# Patient Record
Sex: MALE | Race: BLACK OR AFRICAN AMERICAN | URBAN - METROPOLITAN AREA
[De-identification: names, ages, dates, MRNs, and addresses within clinical notes are randomized per-mention and may not be internally consistent; named-entity substitution may affect disease eponyms.]

---

## 2021-07-23 ENCOUNTER — OFFICE VISIT (OUTPATIENT)
Dept: FAMILY MEDICINE | Facility: CLINIC | Age: 36
End: 2021-07-23
Payer: COMMERCIAL

## 2021-07-23 VITALS
DIASTOLIC BLOOD PRESSURE: 76 MMHG | SYSTOLIC BLOOD PRESSURE: 120 MMHG | TEMPERATURE: 98.6 F | RESPIRATION RATE: 16 BRPM | OXYGEN SATURATION: 97 % | HEART RATE: 64 BPM | WEIGHT: 201.6 LBS

## 2021-07-23 DIAGNOSIS — L30.4 INTERTRIGO: Primary | ICD-10-CM

## 2021-07-23 PROCEDURE — 99203 OFFICE O/P NEW LOW 30 MIN: CPT | Performed by: PHYSICIAN ASSISTANT

## 2021-07-23 RX ORDER — LORATADINE 10 MG/1
10 TABLET ORAL DAILY
COMMUNITY

## 2021-07-23 RX ORDER — ALBUTEROL SULFATE 90 UG/1
2 AEROSOL, METERED RESPIRATORY (INHALATION) EVERY 6 HOURS
COMMUNITY

## 2021-07-23 RX ORDER — MONTELUKAST SODIUM 10 MG/1
10 TABLET ORAL AT BEDTIME
COMMUNITY

## 2021-07-23 RX ORDER — CLOTRIMAZOLE AND BETAMETHASONE DIPROPIONATE 10; .64 MG/G; MG/G
CREAM TOPICAL 2 TIMES DAILY
Qty: 45 G | Refills: 1 | Status: SHIPPED | OUTPATIENT
Start: 2021-07-23

## 2021-07-23 NOTE — PATIENT INSTRUCTIONS
Patient Education     Candida Skin Infection (Adult)   Candida is a type of yeast. It grows naturally on the skin and in the mouth. If it grows out of control, it can cause an infection. Candida can cause infections in the genital area, skin folds, in the mouth, and under the breasts. Anyone can get this infection. It is more common in a person with a weak immune system, such as from diabetes, HIV, or cancer. It s also more common in someone who has been on antibiotic therapy. And it s more common in people who are overweight or who have incontinence. Wearing tight-fitting clothing and taking part in activities with lots of skin-to-skin contact can also put you at risk.  Candida causes the skin to become bright red and inflamed. The border of the infected part of the skin is often raised. The infection causes pain and itching. Sometimes the skin peels and bleeds. In the mouth, candida is called thrush, and may cause white thickened areas.  A Candida rash is most often treated with an antifungal cream, gel, or powder. . The rash will clear a few days after starting the medicine. Infections that don t go away may need a prescription medicine. In rare cases, a bacterial infection can also occur.  Home care  Your healthcare provider will advise using an antifungal cream, powder, or gel for the rash. He or she may also prescribe a medicine for the itch. Follow all instructions for using these medicines.  General care    Keep your skin clean by washing the area twice a day.    Use the medicine as directed until your rash is gone. Once the skin has healed, keep it dry to prevent another infection.     If you are overweight, talk with your healthcare provider about a plan to lose extra weight.    Don't wear tight-fitting clothes.    Follow-up care  Follow up with your healthcare provider, or as advised. Your rash will clear in 7 to 14 days. Call your provider if the rash is not gone after 14 days.  When to get medical  advice  Call your healthcare provider right away if any of these occur:    Pain or redness that gets worse or spreads    Fluid coming from the skin    Yellow crusts on the skin    Fever of 100.4 F (38 C) or higher, or as directed by your provider  Chon last reviewed this educational content on 10/1/2019    3457-3490 [employer]. All rights reserved. This information is not intended as a substitute for professional medical care. Always follow your healthcare professional's instructions.

## 2021-07-23 NOTE — PROGRESS NOTES
Assessment & Plan     [facility]    - clotrimazole-betamethasone (LOTRISONE) 1-0.05 % external cream  Dispense: 45 g; Refill: 1   recommend keeping clean and dry, mild soap without fregrances or dyes, may try powder to the area.  Follow-up for persistent or worsening sx.      BRIDGETTE Joiner [facility] CALLIE Richard is a 35 year old male who presents to clinic today for the following health issues:  Chief Complaint   Patient presents with     Rash     left armpit x 5 day s     HPI   5 day hx of rash, worsening.  Works in the heat, first noted itching, now red, inflammed and more burning.  NO fevers. Otherwise feels well.    Has tried nothing for his sx.  No contact irritants other than soap (bath and body) and deoderant.      PMH: asthma, allergies.        Current Outpatient Medications:      albuterol (PROAIR HFA/PROVENTIL HFA/VENTOLIN HFA) 108 (90 Base) MCG/ACT inhaler, Inhale 2 puffs into the lungs every 6 hours, Disp: , Rfl:      clotrimazole-betamethasone (LOTRISONE) 1-0.05 % external cream, Apply topically 2 times daily, Disp: 45 g, Rfl: 1     loratadine (CLARITIN) 10 MG tablet, Take 10 mg by mouth daily, Disp: , Rfl:      montelukast (SINGULAIR) 10 MG tablet, Take 10 mg by mouth At Bedtime, Disp: , Rfl:     Review of Systems  Constitutional, HEENT, cardiovascular, pulmonary, gi and gu systems are negative, except as otherwise noted.      Objective    /76 (BP Location: Right arm, Patient Position: Chair, Cuff Size: Adult Large)   Pulse 64   Temp 98.6  F (37  C) (Oral)   Resp 16   Wt 91.4 kg (201 lb 9.6 oz)   SpO2 97%   Physical Exam   nad appears well  Exam of the axilla on the L reveals erythematous macular papular  patches, irregular boarder, no vesicles ulcerations.  No pustules.

## 2022-06-16 ENCOUNTER — OFFICE VISIT (OUTPATIENT)
Dept: FAMILY MEDICINE | Facility: CLINIC | Age: 37
End: 2022-06-16
Payer: COMMERCIAL

## 2022-06-16 VITALS
DIASTOLIC BLOOD PRESSURE: 69 MMHG | HEART RATE: 75 BPM | WEIGHT: 211 LBS | OXYGEN SATURATION: 98 % | SYSTOLIC BLOOD PRESSURE: 124 MMHG | TEMPERATURE: 98.3 F

## 2022-06-16 DIAGNOSIS — H66.001 NON-RECURRENT ACUTE SUPPURATIVE OTITIS MEDIA OF RIGHT EAR WITHOUT SPONTANEOUS RUPTURE OF TYMPANIC MEMBRANE: Primary | ICD-10-CM

## 2022-06-16 PROCEDURE — 99213 OFFICE O/P EST LOW 20 MIN: CPT

## 2022-06-16 RX ORDER — NEOMYCIN SULFATE, POLYMYXIN B SULFATE, HYDROCORTISONE 3.5; 10000; 1 MG/ML; [USP'U]/ML; MG/ML
3 SOLUTION/ DROPS AURICULAR (OTIC) 3 TIMES DAILY
Qty: 3 ML | Refills: 0 | Status: SHIPPED | OUTPATIENT
Start: 2022-06-16 | End: 2022-06-21

## 2022-06-16 RX ORDER — AMOXICILLIN 500 MG/1
500 CAPSULE ORAL 3 TIMES DAILY
Qty: 30 CAPSULE | Refills: 0 | Status: SHIPPED | OUTPATIENT
Start: 2022-06-16 | End: 2022-06-26

## 2022-06-16 NOTE — PROGRESS NOTES
Chief Complaint   Patient presents with     Ear Problem     Pressure in the right ear        ASSESSMENT/PLAN:  Jose Manuel was seen today for ear problem.    Diagnoses and all orders for this visit:    Non-recurrent acute suppurative otitis media of right ear without spontaneous rupture of tympanic membrane  -     amoxicillin (AMOXIL) 500 MG capsule; Take 1 capsule (500 mg) by mouth 3 times daily for 10 days  -     neomycin-polymyxin-hydrocortisone (CORTISPORIN) 3.5-25472-9 otic solution; Place 3 drops into the right ear 3 times daily for 5 days    Tympanic membrane was irrigated by medical assistant.  Start treatment on amoxicillin as above along with some topical drops to prevent otitis externa due to the abrasion of the external auditory canal.    Yoel Clayton PA-C      SUBJECTIVE:  Jose Manuel is a 36 year old male who presents to urgent care with right-sided ear pressure since early this morning that woke him up from sleeping.  He also had a little bit of a sore throat and some nasal congestion at that time but this has resolved.  Currently having right ear pressure.  Has a history of ear infections.  No fevers or chills and feels otherwise well    ROS: Pertinent ROS neg other than the symptoms noted above in the HPI.     OBJECTIVE:  /69 (BP Location: Right arm, Patient Position: Chair, Cuff Size: Adult Large)   Pulse 75   Temp 98.3  F (36.8  C) (Oral)   Wt 95.7 kg (211 lb)   SpO2 98%    GENERAL: healthy, alert and no distress  HENT: Left tympanic membrane within normal limits.  Right tympanic membrane partially obstructed by cerumen, after irrigation tympanic membrane was erythematous, bulging and some erythema of the external canal as well    DIAGNOSTICS    No results found for any visits on 06/16/22.     Current Outpatient Medications   Medication     albuterol (PROAIR HFA/PROVENTIL HFA/VENTOLIN HFA) 108 (90 Base) MCG/ACT inhaler     clotrimazole-betamethasone (LOTRISONE) 1-0.05 % external cream      loratadine (CLARITIN) 10 MG tablet     montelukast (SINGULAIR) 10 MG tablet     No current facility-administered medications for this visit.      There is no problem list on file for this patient.     History reviewed. No pertinent past medical history.  History reviewed. No pertinent surgical history.  History reviewed. No pertinent family history.  Social History     Tobacco Use     Smoking status: Never Smoker     Smokeless tobacco: Never Used   Substance Use Topics     Alcohol use: Yes              The plan of care was discussed with the patient. They understand and agree with the course of treatment prescribed. A printed summary was given including instructions and medications.  The use of Dragon/Skoodat dictation services may have been used to construct the content in this note; any grammatical or spelling errors are non-intentional. Please contact the author of this note directly if you are in need of any clarification.

## 2025-03-01 ENCOUNTER — OFFICE VISIT (OUTPATIENT)
Dept: URGENT CARE | Facility: URGENT CARE | Age: 40
End: 2025-03-01
Payer: COMMERCIAL

## 2025-03-01 VITALS
TEMPERATURE: 97.6 F | OXYGEN SATURATION: 95 % | RESPIRATION RATE: 17 BRPM | DIASTOLIC BLOOD PRESSURE: 77 MMHG | HEART RATE: 64 BPM | WEIGHT: 206 LBS | SYSTOLIC BLOOD PRESSURE: 121 MMHG

## 2025-03-01 DIAGNOSIS — R07.0 THROAT PAIN: Primary | ICD-10-CM

## 2025-03-01 DIAGNOSIS — B34.9 VIRAL SYNDROME: ICD-10-CM

## 2025-03-01 LAB
BASOPHILS # BLD AUTO: 0 10E3/UL (ref 0–0.2)
BASOPHILS NFR BLD AUTO: 0 %
DEPRECATED S PYO AG THROAT QL EIA: NEGATIVE
EOSINOPHIL # BLD AUTO: 0.1 10E3/UL (ref 0–0.7)
EOSINOPHIL NFR BLD AUTO: 1 %
ERYTHROCYTE [DISTWIDTH] IN BLOOD BY AUTOMATED COUNT: 12.3 % (ref 10–15)
HCT VFR BLD AUTO: 44.1 % (ref 40–53)
HGB BLD-MCNC: 14.6 G/DL (ref 13.3–17.7)
IMM GRANULOCYTES # BLD: 0 10E3/UL
IMM GRANULOCYTES NFR BLD: 0 %
LYMPHOCYTES # BLD AUTO: 2.5 10E3/UL (ref 0.8–5.3)
LYMPHOCYTES NFR BLD AUTO: 29 %
MCH RBC QN AUTO: 28.9 PG (ref 26.5–33)
MCHC RBC AUTO-ENTMCNC: 33.1 G/DL (ref 31.5–36.5)
MCV RBC AUTO: 87 FL (ref 78–100)
MONOCYTES # BLD AUTO: 0.5 10E3/UL (ref 0–1.3)
MONOCYTES NFR BLD AUTO: 6 %
NEUTROPHILS # BLD AUTO: 5.6 10E3/UL (ref 1.6–8.3)
NEUTROPHILS NFR BLD AUTO: 64 %
PLATELET # BLD AUTO: 242 10E3/UL (ref 150–450)
RBC # BLD AUTO: 5.06 10E6/UL (ref 4.4–5.9)
S PYO DNA THROAT QL NAA+PROBE: NOT DETECTED
WBC # BLD AUTO: 8.8 10E3/UL (ref 4–11)

## 2025-03-01 PROCEDURE — 36415 COLL VENOUS BLD VENIPUNCTURE: CPT | Performed by: PHYSICIAN ASSISTANT

## 2025-03-01 PROCEDURE — 85025 COMPLETE CBC W/AUTO DIFF WBC: CPT | Performed by: PHYSICIAN ASSISTANT

## 2025-03-01 PROCEDURE — 99204 OFFICE O/P NEW MOD 45 MIN: CPT | Performed by: PHYSICIAN ASSISTANT

## 2025-03-01 PROCEDURE — 87651 STREP A DNA AMP PROBE: CPT | Performed by: PHYSICIAN ASSISTANT

## 2025-03-01 RX ORDER — GUAIFENESIN 600 MG/1
1200 TABLET, EXTENDED RELEASE ORAL 2 TIMES DAILY
Qty: 30 TABLET | Refills: 0 | Status: SHIPPED | OUTPATIENT
Start: 2025-03-01

## 2025-03-01 RX ORDER — FLUTICASONE PROPIONATE 50 MCG
1 SPRAY, SUSPENSION (ML) NASAL DAILY
Qty: 9.9 ML | Refills: 0 | Status: SHIPPED | OUTPATIENT
Start: 2025-03-01

## 2025-03-01 ASSESSMENT — ENCOUNTER SYMPTOMS
VOICE CHANGE: 1
FEVER: 1
SHORTNESS OF BREATH: 0
RHINORRHEA: 1
SORE THROAT: 1
COUGH: 1

## 2025-03-01 NOTE — PROGRESS NOTES
SUBJECTIVE:   Jose Manuel Morris is a 39 year old male presenting with a chief complaint of   Chief Complaint   Patient presents with    Urgent Care     Pt presents nasal congestion, throat pain, a lot of mucus in throat and nose, onset 6 days.        He is an established patient of [facility].  Tmax 101.7.  SOB.  Hx of asthma    Treatment:  theraflu and otc cold.    Review of Systems   Constitutional:  Positive for fever.   HENT:  Positive for congestion, postnasal drip, rhinorrhea, sore throat and voice change. Negative for ear pain.    Respiratory:  Positive for cough. Negative for shortness of breath.    All other systems reviewed and are negative.      No past medical history on file.  No family history on file.  Current Outpatient Medications   Medication Sig Dispense Refill    albuterol (PROAIR HFA/PROVENTIL HFA/VENTOLIN HFA) 108 (90 Base) MCG/ACT inhaler Inhale 2 puffs into the lungs every 6 hours      clotrimazole-betamethasone (LOTRISONE) 1-0.05 % external cream Apply topically 2 times daily 45 g 1    fluticasone (FLONASE) 50 MCG/ACT nasal spray Spray 1 spray into both nostrils daily. 9.9 mL 0    guaiFENesin (MUCINEX) 600 MG 12 hr tablet Take 2 tablets (1,200 mg) by mouth 2 times daily. 30 tablet 0    loratadine (CLARITIN) 10 MG tablet Take 10 mg by mouth daily      montelukast (SINGULAIR) 10 MG tablet Take 10 mg by mouth At Bedtime       Social History     Tobacco Use    Smoking status: Never    Smokeless tobacco: Never   Substance Use Topics    Alcohol use: Yes       OBJECTIVE  /77   Pulse 64   Temp 97.6  F (36.4  C) (Tympanic)   Resp 17   Wt 93.4 kg (206 lb)   SpO2 95%     Physical Exam  Vitals and nursing note reviewed.   Constitutional:       Appearance: Normal appearance. He is normal weight.   HENT:      Head: Normocephalic and atraumatic.      Right Ear: Tympanic membrane, ear canal and external ear normal.      Left Ear: Tympanic membrane, ear canal and external ear normal.      Ears:       Comments: Bilateral TM erythema, minor     Nose: Nose normal.      Mouth/Throat:      Mouth: Mucous membranes are moist.      Pharynx: Oropharynx is clear.   Eyes:      Extraocular Movements: Extraocular movements intact.      Conjunctiva/sclera: Conjunctivae normal.   Cardiovascular:      Rate and Rhythm: Normal rate and regular rhythm.      Pulses: Normal pulses.      Heart sounds: Normal heart sounds.   Pulmonary:      Effort: Pulmonary effort is normal.      Breath sounds: Normal breath sounds.   Musculoskeletal:      Cervical back: Normal range of motion and neck supple. No rigidity. No muscular tenderness.   Skin:     General: Skin is warm and dry.   Neurological:      General: No focal deficit present.      Mental Status: He is alert.   Psychiatric:         Mood and Affect: Mood normal.         Behavior: Behavior normal.         Labs:  Results for orders placed or performed in visit on 03/01/25 (from the past 24 hours)   Streptococcus A Rapid Screen w/Reflex to PCR - Clinic Collect    Specimen: Throat; Swab   Result Value Ref Range    Group A Strep antigen Negative Negative   CBC with platelets and differential    Narrative    The following orders were created for panel order CBC with platelets and differential.  Procedure                               Abnormality         Status                     ---------                               -----------         ------                     CBC with platelets and d...[[id number]]                      Final result                 Please view results for these tests on the individual orders.   CBC with platelets and differential   Result Value Ref Range    WBC Count 8.8 4.0 - 11.0 10e3/uL    RBC Count 5.06 4.40 - 5.90 10e6/uL    Hemoglobin 14.6 13.3 - 17.7 g/dL    Hematocrit 44.1 40.0 - 53.0 %    MCV 87 78 - 100 fL    MCH 28.9 26.5 - 33.0 pg    MCHC 33.1 31.5 - 36.5 g/dL    RDW 12.3 10.0 - 15.0 %    Platelet Count 242 150 - 450 10e3/uL    % Neutrophils 64 %    %  Lymphocytes 29 %    % Monocytes 6 %    % Eosinophils 1 %    % Basophils 0 %    % Immature Granulocytes 0 %    Absolute Neutrophils 5.6 1.6 - 8.3 10e3/uL    Absolute Lymphocytes 2.5 0.8 - 5.3 10e3/uL    Absolute Monocytes 0.5 0.0 - 1.3 10e3/uL    Absolute Eosinophils 0.1 0.0 - 0.7 10e3/uL    Absolute Basophils 0.0 0.0 - 0.2 10e3/uL    Absolute Immature Granulocytes 0.0 <=0.4 10e3/uL       ASSESSMENT:      ICD-10-CM    1. Throat pain  R07.0 Streptococcus A Rapid Screen w/Reflex to PCR - Clinic Collect     Group A Streptococcus PCR Throat Swab     CBC with platelets and differential     CBC with platelets and differential      2. Viral syndrome  B34.9 fluticasone (FLONASE) 50 MCG/ACT nasal spray     guaiFENesin (MUCINEX) 600 MG 12 hr tablet           Medical Decision Making:    Differential Diagnosis:  URI Adult/Peds:  Sinusitis, Strep pharyngitis, Viral pharyngitis, Viral syndrome, and Viral upper respiratory illness    Serious Comorbid Conditions:  Reviewed      PLAN:    Tylenol/motrin as needed.  Drink plenty of water.  Gargle with salt water.  May use chloraseptic spray as directed for ST.  Strep pcr pending.  Rx for flonase and mucinex    Followup:    If not improving or if condition worsens, follow up with your Primary Care Provider, If not improving or if conditions worsens over the next 12-24 hours, go to the Emergency Department    There are no Patient Instructions on file for this visit.